# Patient Record
Sex: FEMALE | Race: WHITE | NOT HISPANIC OR LATINO | URBAN - METROPOLITAN AREA
[De-identification: names, ages, dates, MRNs, and addresses within clinical notes are randomized per-mention and may not be internally consistent; named-entity substitution may affect disease eponyms.]

---
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
## 2019-07-23 NOTE — PATIENT DISCUSSION
"""Patient elects to change glasses.  Rx given. """ Patient calling regarding levothyroxine (SYNTHROID, LEVOTHROID) 300 MCG tablet. Patient feels like the dosage is to high and has been feeling anxious. Patient is requesting a call back, ok to leave detailed message on voicemail. Patient also needs refill of same prescription, her preferred pharmacy is [facility] in [place].

## 2021-06-14 ASSESSMENT — TONOMETRY
OS_IOP_MMHG: 18
OD_IOP_MMHG: 18

## 2021-06-14 ASSESSMENT — VISUAL ACUITY
OD_CC: 20/20
OS_CC: 20/20